# Patient Record
(demographics unavailable — no encounter records)

---

## 2025-05-07 NOTE — PHYSICAL EXAM
[Obese] : obese [No Acute Distress] : no acute distress [No Proptosis] : no proptosis [No Lid Lag] : no lid lag [No Respiratory Distress] : no respiratory distress [No Accessory Muscle Use] : no accessory muscle use [Clear to Auscultation] : lungs were clear to auscultation bilaterally [Normal S1, S2] : normal S1 and S2 [No Murmurs] : no murmurs [No Edema] : no peripheral edema [Not Tender] : non-tender [Not Distended] : not distended [Soft] : abdomen soft [Acanthosis Nigricans] : acanthosis nigricans present [No Tremors] : no tremors [Abdominal Striae] : no abdominal striae [Acne] : no acne [Hirsutism] : no hirsutism [de-identified] :  sever acanthosis [de-identified] : suprapubic catheter

## 2025-05-07 NOTE — REASON FOR VISIT
[Weight Management/Obesity] : weight management/obesity [Other: _____] : [unfilled] [Follow - Up] : a follow-up visit [FreeTextEntry2] : used to follow with peds endocrinology dr Shanda Suarez

## 2025-05-07 NOTE — DATA REVIEWED
[FreeTextEntry1] : 5/29/21: HBa1c 5.6% glucose 97 crea 0.4   K 4.5 hb 10.1 TSH 3.14  TT4 8.5  TG 96 25 vit D 27   5/2022: Ac1 5.5%   TG 91 glucose 96 crea 0.44  tsh 2.32  ft4 1.1  hb 10.2 25 vit d 30   prolactin 2.9 estradiol 56 FSH 4.5 LH 7.8 progesterone 0.5  12/2024: reviewed A1c 5.8%

## 2025-05-07 NOTE — ASSESSMENT
[Carbohydrate Consistent Diet] : carbohydrate consistent diet [Importance of Diet and Exercise] : importance of diet and exercise to improve glycemic control, achieve weight loss and improve cardiovascular health [Exercise/Effect on Glucose] : exercise/effect on glucose [Weight Loss] : weight loss [FreeTextEntry1] : 24 years old female with known Autism non verbal , obesity who present for follow up  evaluation of preDM , obesity   #preDM/obesity  - followed by Dr GARNER ( justin yepez ) for around 10 years , for insulin resistance, predm  - was on metformin liquid ( unable to take oral ) for few years then stopped likely in 2020  - During 2020 she was home and less active so she gained some weight  - also was following with nutritionist  - 12/2024: A1c 5.8% - discussed restarting metformin vs continuing lifestyle changes and mother prefer to continue with lifestyle changes , monitor with low fat diet too    #amenorrhea / menorrhagia  - doesn't get any periods and was getting progesterone for withdrawal bleed once or twice a month - 6/2022: has been bleeding now for almost 3 weeks and has breast tenderness  - previous  hormonal work up ok prolactin normal and TSH normal  per GYn not a candidate for OCP higher risk of blood clots?   can be followed yearly earlier if any major change

## 2025-05-07 NOTE — HISTORY OF PRESENT ILLNESS
[FreeTextEntry1] : 24 years old female with known Autism non verbal , obesity who present for follow up evaluation of preDM , obesity   - followed by Dr GARNER ( justin yepez ) for around 10 years , for insulin resistance, predm  - was on metformin liquid (unable to take oral ) for few years then stopped likely in 2020  - During 2020 she was home and less active, so she gained some weight  - also was following with nutritionist  - doesn't get any periods and was getting progesterone for withdrawal bleed once or twice a month - 6/2022: here for follow up no major changes except bleeding for almost 3 weeks now , seeing GYN and had work up done, also planned for breast imaging  - 2025 : here for follow up with mom, is stable overall, lost weight as she needed a suprapubic catheter inserted most recent A1c 5.8 % Tg elevated